# Patient Record
(demographics unavailable — no encounter records)

---

## 2024-10-29 NOTE — ASSESSMENT
[FreeTextEntry1] : Abnormal urine, urethral pain, testicular pain  Urine dip: Trace blood Lab: U/A with reflex Urine culture from [facility] neg, GC/Chlamydia neg Will send for renal/bladder US Will send for testicular US Discussed bladder irritants and encouraged to decrease caffeine RTO for Cysto due to urethral pain x 2 weeks

## 2024-10-29 NOTE — PHYSICAL EXAM
[General Appearance - Well Developed] : well developed [General Appearance - Well Nourished] : well nourished [Normal Appearance] : normal appearance [Well Groomed] : well groomed [General Appearance - In No Acute Distress] : no acute distress [Respiration, Rhythm And Depth] : normal respiratory rhythm and effort [Exaggerated Use Of Accessory Muscles For Inspiration] : no accessory muscle use [Abdomen Soft] : soft [Abdomen Tenderness] : non-tender [Abdomen Hernia] : no hernia was discovered [Urethral Meatus] : meatus normal [Penis Abnormality] : normal circumcised penis [Urinary Bladder Findings] : the bladder was normal on palpation [Scrotum] : the scrotum was normal [Testes Tenderness] : no tenderness of the testes [Testes Mass (___cm)] : there were no testicular masses [Normal Station and Gait] : the gait and station were normal for the patient's age [Skin Color & Pigmentation] : normal skin color and pigmentation [] : no rash [No Focal Deficits] : no focal deficits [Oriented To Time, Place, And Person] : oriented to person, place, and time [Affect] : the affect was normal [Mood] : the mood was normal [Not Anxious] : not anxious [No Palpable Adenopathy] : no palpable adenopathy [Chaperone Present] : A chaperone was present in the examining room during all aspects of the physical examination [de-identified] : minimal tenderness to left epididymis [FreeTextEntry2] : Iva FISH

## 2024-10-29 NOTE — ASSESSMENT
[FreeTextEntry1] : Abnormal urine, urethral pain, testicular pain  Urine dip: Trace blood Lab: U/A with reflex Urine culture from [facility] neg, GC/Chlamydia neg Will send for renal/bladder US Will send for testicular US Discussed bladder irritants and encouraged to decrease caffeine RTO for Cysto due to urethral pain x 2 weeks

## 2024-10-29 NOTE — PHYSICAL EXAM
[General Appearance - Well Developed] : well developed [General Appearance - Well Nourished] : well nourished [Normal Appearance] : normal appearance [Well Groomed] : well groomed [General Appearance - In No Acute Distress] : no acute distress [Exaggerated Use Of Accessory Muscles For Inspiration] : no accessory muscle use [Respiration, Rhythm And Depth] : normal respiratory rhythm and effort [Abdomen Soft] : soft [Abdomen Tenderness] : non-tender [Abdomen Hernia] : no hernia was discovered [Urethral Meatus] : meatus normal [Penis Abnormality] : normal circumcised penis [Urinary Bladder Findings] : the bladder was normal on palpation [Scrotum] : the scrotum was normal [Testes Tenderness] : no tenderness of the testes [Testes Mass (___cm)] : there were no testicular masses [Normal Station and Gait] : the gait and station were normal for the patient's age [Skin Color & Pigmentation] : normal skin color and pigmentation [] : no rash [No Focal Deficits] : no focal deficits [Oriented To Time, Place, And Person] : oriented to person, place, and time [Affect] : the affect was normal [Not Anxious] : not anxious [Mood] : the mood was normal [No Palpable Adenopathy] : no palpable adenopathy [Chaperone Present] : A chaperone was present in the examining room during all aspects of the physical examination [de-identified] : minimal tenderness to left epididymis [FreeTextEntry2] : Iva FISH

## 2024-10-29 NOTE — REVIEW OF SYSTEMS
[Negative] : Heme/Lymph [Dysuria] : dysuria [Testicular Pain] : testicular pain [Arthralgias] : arthralgias

## 2024-10-29 NOTE — HISTORY OF PRESENT ILLNESS
[FreeTextEntry1] : With c/o urethral pain x 2 weeks regardless of voiding. He went to urgent care 5 days ago for same complaint. He was prescribed Macrobid x 7 days, on day 5/7. His urine culture came back negative He went to his PCP yesterday and was advised to f/u with Urology.  Pt states urethral pain is a little less today.   With some left low back pain. 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 With c/o intermittent left testicular pain x 1 week. Rates pain #3.10  He reports drinking adequate water He drinks 72 oz of coffee Pt sexually active with steady girlfriend. Denies STD